# Patient Record
Sex: FEMALE | URBAN - METROPOLITAN AREA
[De-identification: names, ages, dates, MRNs, and addresses within clinical notes are randomized per-mention and may not be internally consistent; named-entity substitution may affect disease eponyms.]

---

## 2020-11-06 ENCOUNTER — OFFICE VISIT (OUTPATIENT)
Dept: URBAN - METROPOLITAN AREA CLINIC 91 | Facility: CLINIC | Age: 45
End: 2020-11-06
Payer: COMMERCIAL

## 2020-11-06 DIAGNOSIS — H35.373 PUCKERING OF MACULA, BILATERAL: Primary | ICD-10-CM

## 2020-11-06 DIAGNOSIS — H25.13 BILATERAL NUCLEAR SCLEROSIS CATARACT: ICD-10-CM

## 2020-11-06 DIAGNOSIS — H04.123 DRY EYE SYNDROME OF BILATERAL LACRIMAL GLANDS: ICD-10-CM

## 2020-11-06 DIAGNOSIS — G40.909 EPILEPSY: ICD-10-CM

## 2020-11-06 PROCEDURE — 92014 COMPRE OPH EXAM EST PT 1/>: CPT | Performed by: OPHTHALMOLOGY

## 2020-11-06 PROCEDURE — 92134 CPTRZ OPH DX IMG PST SGM RTA: CPT | Performed by: OPHTHALMOLOGY

## 2020-11-06 ASSESSMENT — VISUAL ACUITY
OD: 20/20
OS: 20/20

## 2020-11-06 ASSESSMENT — INTRAOCULAR PRESSURE
OD: 13
OS: 13

## 2020-11-06 NOTE — IMPRESSION/PLAN
Impression: Epilepsy: G40.909. Plan: Due to hx of epilepsy and complaints of vision changes, recommend VF 30-2 next visit. Also recommend eval with neuro ophthalmologist (Dr. Mitchel Hartman).

## 2020-11-06 NOTE — IMPRESSION/PLAN
Impression: Puckering of macula, bilateral: H35.373. Stable, OU. Plan: I discussed epiretinal membrane. I will obtain baseline testing at this time. Patient advised to monitor vision closely, and to call our office immediately if change is noted.

## 2025-04-23 ENCOUNTER — APPOINTMENT (OUTPATIENT)
Dept: URGENT CARE | Facility: CLINIC | Age: 50
End: 2025-04-23
Payer: MEDICAID

## 2025-04-23 ENCOUNTER — HOSPITAL ENCOUNTER (EMERGENCY)
Facility: MEDICAL CENTER | Age: 50
End: 2025-04-23
Attending: EMERGENCY MEDICINE
Payer: MEDICAID

## 2025-04-23 VITALS
WEIGHT: 115.52 LBS | OXYGEN SATURATION: 99 % | HEART RATE: 62 BPM | SYSTOLIC BLOOD PRESSURE: 160 MMHG | BODY MASS INDEX: 20.47 KG/M2 | HEIGHT: 63 IN | RESPIRATION RATE: 16 BRPM | DIASTOLIC BLOOD PRESSURE: 96 MMHG | TEMPERATURE: 98 F

## 2025-04-23 DIAGNOSIS — L55.9 SUNBURN: ICD-10-CM

## 2025-04-23 DIAGNOSIS — R53.81 MALAISE: ICD-10-CM

## 2025-04-23 DIAGNOSIS — R22.0 LIP SWELLING: ICD-10-CM

## 2025-04-23 LAB
ALBUMIN SERPL BCP-MCNC: 4.7 G/DL (ref 3.2–4.9)
ALBUMIN/GLOB SERPL: 1.5 G/DL
ALP SERPL-CCNC: 85 U/L (ref 30–99)
ALT SERPL-CCNC: 13 U/L (ref 2–50)
ANION GAP SERPL CALC-SCNC: 14 MMOL/L (ref 7–16)
AST SERPL-CCNC: 25 U/L (ref 12–45)
BASOPHILS # BLD AUTO: 0.5 % (ref 0–1.8)
BASOPHILS # BLD: 0.03 K/UL (ref 0–0.12)
BILIRUB SERPL-MCNC: 0.3 MG/DL (ref 0.1–1.5)
BUN SERPL-MCNC: 10 MG/DL (ref 8–22)
CALCIUM ALBUM COR SERPL-MCNC: 9.3 MG/DL (ref 8.5–10.5)
CALCIUM SERPL-MCNC: 9.9 MG/DL (ref 8.5–10.5)
CHLORIDE SERPL-SCNC: 102 MMOL/L (ref 96–112)
CO2 SERPL-SCNC: 23 MMOL/L (ref 20–33)
CREAT SERPL-MCNC: 0.83 MG/DL (ref 0.5–1.4)
EOSINOPHIL # BLD AUTO: 0.16 K/UL (ref 0–0.51)
EOSINOPHIL NFR BLD: 2.6 % (ref 0–6.9)
ERYTHROCYTE [DISTWIDTH] IN BLOOD BY AUTOMATED COUNT: 44.7 FL (ref 35.9–50)
GFR SERPLBLD CREATININE-BSD FMLA CKD-EPI: 85 ML/MIN/1.73 M 2
GLOBULIN SER CALC-MCNC: 3.2 G/DL (ref 1.9–3.5)
GLUCOSE SERPL-MCNC: 90 MG/DL (ref 65–99)
HCT VFR BLD AUTO: 38.6 % (ref 37–47)
HGB BLD-MCNC: 13.6 G/DL (ref 12–16)
IMM GRANULOCYTES # BLD AUTO: 0 K/UL (ref 0–0.11)
IMM GRANULOCYTES NFR BLD AUTO: 0 % (ref 0–0.9)
LYMPHOCYTES # BLD AUTO: 2.64 K/UL (ref 1–4.8)
LYMPHOCYTES NFR BLD: 42.4 % (ref 22–41)
MCH RBC QN AUTO: 33.6 PG (ref 27–33)
MCHC RBC AUTO-ENTMCNC: 35.2 G/DL (ref 32.2–35.5)
MCV RBC AUTO: 95.3 FL (ref 81.4–97.8)
MONOCYTES # BLD AUTO: 0.45 K/UL (ref 0–0.85)
MONOCYTES NFR BLD AUTO: 7.2 % (ref 0–13.4)
NEUTROPHILS # BLD AUTO: 2.94 K/UL (ref 1.82–7.42)
NEUTROPHILS NFR BLD: 47.3 % (ref 44–72)
NRBC # BLD AUTO: 0 K/UL
NRBC BLD-RTO: 0 /100 WBC (ref 0–0.2)
PLATELET # BLD AUTO: 243 K/UL (ref 164–446)
PMV BLD AUTO: 8.4 FL (ref 9–12.9)
POTASSIUM SERPL-SCNC: 3.6 MMOL/L (ref 3.6–5.5)
PROT SERPL-MCNC: 7.9 G/DL (ref 6–8.2)
RBC # BLD AUTO: 4.05 M/UL (ref 4.2–5.4)
SODIUM SERPL-SCNC: 139 MMOL/L (ref 135–145)
WBC # BLD AUTO: 6.2 K/UL (ref 4.8–10.8)

## 2025-04-23 PROCEDURE — 80053 COMPREHEN METABOLIC PANEL: CPT

## 2025-04-23 PROCEDURE — 85025 COMPLETE CBC W/AUTO DIFF WBC: CPT

## 2025-04-23 PROCEDURE — 36415 COLL VENOUS BLD VENIPUNCTURE: CPT

## 2025-04-23 PROCEDURE — 700111 HCHG RX REV CODE 636 W/ 250 OVERRIDE (IP): Mod: UD | Performed by: EMERGENCY MEDICINE

## 2025-04-23 PROCEDURE — 99283 EMERGENCY DEPT VISIT LOW MDM: CPT

## 2025-04-23 RX ORDER — ONDANSETRON 4 MG/1
4 TABLET, ORALLY DISINTEGRATING ORAL EVERY 6 HOURS PRN
Qty: 10 TABLET | Refills: 0 | Status: SHIPPED | OUTPATIENT
Start: 2025-04-23

## 2025-04-23 RX ORDER — ONDANSETRON 4 MG/1
4 TABLET, ORALLY DISINTEGRATING ORAL ONCE
Status: COMPLETED | OUTPATIENT
Start: 2025-04-23 | End: 2025-04-23

## 2025-04-23 RX ADMIN — ONDANSETRON 4 MG: 4 TABLET, ORALLY DISINTEGRATING ORAL at 20:16

## 2025-04-23 ASSESSMENT — PAIN DESCRIPTION - PAIN TYPE: TYPE: ACUTE PAIN

## 2025-04-23 NOTE — Clinical Note
Cindy Rodrigues was seen and treated in our emergency department on 4/23/2025.  She may return to work on 04/25/2025.       If you have any questions or concerns, please don't hesitate to call.      Brandon Isaacs D.O.

## 2025-04-24 NOTE — DISCHARGE INSTRUCTIONS
Use Motrin Tylenol for pain, use Benadryl for resting or burning sensation.    Use the nausea medication and to drink plenty of fluids.    Some of this sensation may be related to the change in altitude that you are not used to.  Please continue activity as tolerated return for any change or worsening symptoms

## 2025-04-24 NOTE — ED NOTES
Pt ambulated back to 79 PUR with a steady gait without incident. Primary assessment complete. Chart up for ERP.

## 2025-04-24 NOTE — ED TRIAGE NOTES
"Chief Complaint   Patient presents with    Sunburn     Pt reports sunburn chest sunburn from working outside, no open blisters. + nausea. Pt is concerned due to being dehydrated with hx of sz on medications. Pt reports 5/10 pain.     Patient ambulatory to triage for above complaint. Patient A&Ox4, GCS 15, patient speaking in full sentences. Equal and unlabored respirations. Patient educated on triage process and encouraged to notify staff if condition worsens. Appropriate protocols ordered. Patient returned to the lobby in stable condition.        BP (!) 163/89   Pulse 67   Temp 36 °C (96.8 °F) (Temporal)   Resp 16   Ht 1.6 m (5' 3\")   Wt 52.4 kg (115 lb 8.3 oz)   SpO2 98%   BMI 20.46 kg/m²     "

## 2025-04-24 NOTE — ED PROVIDER NOTES
"ER Provider Note    Scribed for Brandon Isaacs D.O. by Swapna Jacinto. 4/23/2025  7:53 PM    Primary Care Provider: Pcp Pt States None    CHIEF COMPLAINT  Chief Complaint   Patient presents with    Sunburn     Pt reports sunburn chest sunburn from working outside, no open blisters. + nausea. Pt is concerned due to being dehydrated with hx of sz on medications. Pt reports 5/10 pain.     HPI/ROS    OUTSIDE HISTORIAN(S):  Daughter present at bedside provided information about the patient's history as detailed in the HPI.    Cindy Rodrigues is a 49 y.o. female who presents to the Emergency Department for evaluation of a sunburn onset yesterday. 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 She notes when she woke up this morning she noticed lip swelling. She adds she has had nausea, hot, and cold chills. She denies shortness of breath more than normal. She states she doesn't drink a lot of water usually, and is feeling poor.    ROS as per HPI.    PAST MEDICAL HISTORY  History reviewed. No pertinent past medical history.    SURGICAL HISTORY  History reviewed. No pertinent surgical history.    FAMILY HISTORY  History reviewed. No pertinent family history.    SOCIAL HISTORY   reports that she has never smoked. She has never used smokeless tobacco. She reports that she does not currently use alcohol. She reports current drug use. Drug: Inhaled.    CURRENT MEDICATIONS  Previous Medications    No pertinent medications noted       ALLERGIES  Patient has no known allergies.    PHYSICAL EXAM  BP (!) 163/89   Pulse 67   Temp 36 °C (96.8 °F) (Temporal)   Resp 16   Ht 1.6 m (5' 3\")   Wt 52.4 kg (115 lb 8.3 oz)   SpO2 98%   BMI 20.46 kg/m²     General: No acute distress.  HENT: Normocephalic, Mucus membranes are moist. Swelling of lower lip no intraoral swelling.  Chest: Lungs have even and unlabored respirations  Cardiovascular: No peripheral " cyanosis.  Abdomen: Non distended.  Skin: Erythema/ sun burn to anterior upper chest and neck.  Neuro: Awake, Conversive, Able to relay recent events.  Psychiatric: Calm and cooperative.     INITIAL ASSESSMENT  Patient is overall feeling poorly, recently came from [place] and is working outdoors. She had some sun exposure and is not drinking much water. I will treat her for her nausea and encourage PO fluid and evaluate for electrolyte abnormality with lab.    DIAGNOSTIC STUDIES    Labs:   Results for orders placed or performed during the hospital encounter of 04/23/25   CBC WITH DIFFERENTIAL    Collection Time: 04/23/25  8:08 PM   Result Value Ref Range    WBC 6.2 4.8 - 10.8 K/uL    RBC 4.05 (L) 4.20 - 5.40 M/uL    Hemoglobin 13.6 12.0 - 16.0 g/dL    Hematocrit 38.6 37.0 - 47.0 %    MCV 95.3 81.4 - 97.8 fL    MCH 33.6 (H) 27.0 - 33.0 pg    MCHC 35.2 32.2 - 35.5 g/dL    RDW 44.7 35.9 - 50.0 fL    Platelet Count 243 164 - 446 K/uL    MPV 8.4 (L) 9.0 - 12.9 fL    Neutrophils-Polys 47.30 44.00 - 72.00 %    Lymphocytes 42.40 (H) 22.00 - 41.00 %    Monocytes 7.20 0.00 - 13.40 %    Eosinophils 2.60 0.00 - 6.90 %    Basophils 0.50 0.00 - 1.80 %    Immature Granulocytes 0.00 0.00 - 0.90 %    Nucleated RBC 0.00 0.00 - 0.20 /100 WBC    Neutrophils (Absolute) 2.94 1.82 - 7.42 K/uL    Lymphs (Absolute) 2.64 1.00 - 4.80 K/uL    Monos (Absolute) 0.45 0.00 - 0.85 K/uL    Eos (Absolute) 0.16 0.00 - 0.51 K/uL    Baso (Absolute) 0.03 0.00 - 0.12 K/uL    Immature Granulocytes (abs) 0.00 0.00 - 0.11 K/uL    NRBC (Absolute) 0.00 K/uL   COMP METABOLIC PANEL    Collection Time: 04/23/25  8:08 PM   Result Value Ref Range    Sodium 139 135 - 145 mmol/L    Potassium 3.6 3.6 - 5.5 mmol/L    Chloride 102 96 - 112 mmol/L    Co2 23 20 - 33 mmol/L    Anion Gap 14.0 7.0 - 16.0    Glucose 90 65 - 99 mg/dL    Bun 10 8 - 22 mg/dL    Creatinine 0.83 0.50 - 1.40 mg/dL    Calcium 9.9 8.5 - 10.5 mg/dL    Correct Calcium 9.3 8.5 - 10.5 mg/dL     AST(SGOT) 25 12 - 45 U/L    ALT(SGPT) 13 2 - 50 U/L    Alkaline Phosphatase 85 30 - 99 U/L    Total Bilirubin 0.3 0.1 - 1.5 mg/dL    Albumin 4.7 3.2 - 4.9 g/dL    Total Protein 7.9 6.0 - 8.2 g/dL    Globulin 3.2 1.9 - 3.5 g/dL    A-G Ratio 1.5 g/dL   ESTIMATED GFR    Collection Time: 04/23/25  8:08 PM   Result Value Ref Range    GFR (CKD-EPI) 85 >60 mL/min/1.73 m 2         COURSE & MEDICAL DECISION MAKING     COURSE AND PLAN  7:53 PM - Patient seen and examined at bedside. Discussed plan of care, including diagnostic labs. Patient agrees to the plan of care. The patient will be  medicated with Zofran ODT 4 mg. Ordered for CBC w diff and CMP to evaluate her symptoms.     8:53 PM - I reevaluated the patient at bedside. The patient informs me they feel improved following medication administration and no longer feel nauseous and is tolerating fluids. I discussed the patient's diagnostic study results which show no acute abnormalities. I discussed plan for discharge and follow up as outlined below. The patient is stable for discharge at this time and will return for any new or worsening symptoms. Patient verbalizes understanding and support with my plan for discharge.     ED Summary: Patient is visiting from [place], she is working outdoors she did have a sunburn to the anterior chest and neck and to the lips also with some swelling.  She has no intraoral swelling only at the lower lip.  She overall does not feel well with some nausea.  Lab test shows no electrolyte imbalance no concerns for infection.  She is medicated for the nausea with good results.    This patient has moved here recently and has had higher elevation which may be the cause of some of the malaise that she has.  She is discharged home to drink extra fluids and activity as tolerated.      DISPOSITION AND DISCUSSIONS  I have discussed management of the patient with the following physicians and CASIMIRO's: None    Discussion of management with other [facility] [place] or  appropriate source(s): None    Barriers to care at this time, including but not limited to: No PCP.     The patient will return for new or worsening symptoms and is stable at the time of discharge.    The patient is referred to a primary physician for blood pressure management, diabetic screening, and for all other preventative health concerns.    DISPOSITION:  Patient will be discharged home in stable condition.    FOLLOW UP:  [address]  [facility] [address]  [phone]  In 1 week        OUTPATIENT MEDICATIONS:  Discharge Medication List as of 4/23/2025  8:59 PM        START taking these medications    Details   ondansetron (ZOFRAN ODT) 4 MG TABLET DISPERSIBLE Take 1 Tablet by mouth every 6 hours as needed for Nausea/Vomiting., Disp-10 Tablet, R-0, Normal             FINAL DIAGNOSIS  1. Sunburn    2. Lip swelling    3. Malaise        Swapna GLASER (Scribe), am scribing for, and in the presence of, Brandon Isaacs D.O..    Electronically signed by: Swapna Jacinto (Scribe), 4/23/2025    Brandon GLASER D.O. personally performed the services described in this documentation, as scribed by Swapna Jacinto in my presence, and it is both accurate and complete.     The note accurately reflects work and decisions made by me.  Brandon Isaacs D.O.  4/23/2025  10:38 PM